# Patient Record
Sex: MALE | URBAN - METROPOLITAN AREA
[De-identification: names, ages, dates, MRNs, and addresses within clinical notes are randomized per-mention and may not be internally consistent; named-entity substitution may affect disease eponyms.]

---

## 2017-03-09 ENCOUNTER — IMPORTED ENCOUNTER (OUTPATIENT)
Dept: URBAN - METROPOLITAN AREA CLINIC 9 | Facility: CLINIC | Age: 82
End: 2017-03-09

## 2017-05-16 NOTE — PATIENT DISCUSSION
(A99.603) Other secondary cataract, bilateral - Assesment : Significant posterior capsule opacification present. - Plan : YAG OU

## 2017-05-16 NOTE — PATIENT DISCUSSION
(S05.803) Dermatochalasis of left upper eyelid - Assesment : Examination revealed Dermatochalasis - Plan : LID EVAL

## 2017-05-16 NOTE — PATIENT DISCUSSION
(E14.002) Dermatochalasis of right upper eyelid - Assesment : Examination revealed Dermatochalasis - Plan : LID EVAL

## 2017-06-16 NOTE — PATIENT DISCUSSION
(R93.242) Other secondary cataract, bilateral - Assesment : S/P yag capsulotomy,open/clear - Plan : Monitor for changes. Advised patient to call our office with decreased vision or an increase in symptoms.  05/2018 Exam

## 2017-06-16 NOTE — PATIENT DISCUSSION
(K52.761) Dermatochalasis of right upper eyelid - Assesment : Examination revealed Dermatochalasis - Plan : LID EVAL

## 2017-06-16 NOTE — PATIENT DISCUSSION
(N23.525) Dermatochalasis of left upper eyelid - Assesment : Examination revealed Dermatochalasis - Plan : LID EVAL

## 2017-09-07 ENCOUNTER — IMPORTED ENCOUNTER (OUTPATIENT)
Dept: URBAN - METROPOLITAN AREA CLINIC 9 | Facility: CLINIC | Age: 82
End: 2017-09-07

## 2018-03-08 ENCOUNTER — IMPORTED ENCOUNTER (OUTPATIENT)
Dept: URBAN - METROPOLITAN AREA CLINIC 9 | Facility: CLINIC | Age: 83
End: 2018-03-08

## 2018-08-02 NOTE — PATIENT DISCUSSION
(U99.116) Dermatochalasis of right upper eyelid - Assesment : Examination revealed Dermatochalasis - Plan : LID EVAL

## 2018-08-02 NOTE — PATIENT DISCUSSION
(Z57.934) Dermatochalasis of left upper eyelid - Assesment : Examination revealed Dermatochalasis - Plan : LID EVAL

## 2018-09-10 ENCOUNTER — IMPORTED ENCOUNTER (OUTPATIENT)
Dept: URBAN - METROPOLITAN AREA CLINIC 9 | Facility: CLINIC | Age: 83
End: 2018-09-10

## 2019-02-05 NOTE — PATIENT DISCUSSION
(H35.945) Keratoconjunct sicca, not specified as Sjogren's, bilateral - Assesment : NO FOREIGN BODY SEEN TODAY OD.   CORNEA IS IRRITATED SLIGHTLY OD - Plan : MAXITROL OINT AT BEDTIME FOR 10 DAYS OD (SENT TO PHARMACY) PF AT RECOMMENDED DURING THE DAY

## 2019-02-11 NOTE — PATIENT DISCUSSION
(N65.976) Keratoconjunct sicca, not specified as Sjogren's, bilateral - Assesment : PATIENT RECENTLY SAW PCP. PATIENT ON VALTREX. DX WITH SHINGLES. SOMETIMES THE SHINGLES CAN GET INSIDE THE EYE. RIGHT NOW THEY ARE ON THE LIDS AND NOT INSIDE THE EYE. SINCE FRIDAY IT GOT WORSE THEN GOT BETTER WITH THE SWELLING PER PATIENT. IMPROVING DAY TO DAY PER PT. CONTINUE VALTREX AT THIS TIME. PATIENT CAN TAKE IBUPROFEN AT THIS TIME FOR PAIN - Plan : CONTINUE MAXITROL OINT AT BEDTIME OD (RX SENT TO PHARMACY) CONTINUE VALTREX UNTIL IT RUNS OUT  PF AT RECOMMENDED DURING THE DAY  1 WEEK.   PATIENT TO CALL IF ANY DECREASE IN VISION

## 2019-02-11 NOTE — PATIENT DISCUSSION
(B02.9) Zoster without complications - Assesment : PATIENT RECENTLY SAW PCP. PATIENT ON VALTREX. DX WITH SHINGLES. SOMETIMES THE SHINGLES CAN GET INSIDE THE EYE. RIGHT NOW THEY ARE ON THE LIDS AND NOT INSIDE THE EYE. SINCE FRIDAY IT GOT WORSE THEN GOT BETTER WITH THE SWELLING PER PATIENT. IMPROVING DAY TO DAY PER PT. CONTINUE VALTREX AT THIS TIME.   PATIENT CAN TAKE IBUPROFEN AT THIS TIME FOR PAIN - Plan : CONTINUE VALTREX AT THIS TIME

## 2019-02-18 NOTE — PATIENT DISCUSSION
(B02.9) Zoster without complications - Assesment : SHINGLES RIGHT SIDE  DECREASED LID EDEMA AND ERYTHEMA  VESICLES RESOLVING. - Plan : CONTINUE VALTREX  CALL WITH INCREASED SYMPTOMS, FLOATERS  8/2019 EXAM

## 2019-03-11 ENCOUNTER — IMPORTED ENCOUNTER (OUTPATIENT)
Dept: URBAN - METROPOLITAN AREA CLINIC 9 | Facility: CLINIC | Age: 84
End: 2019-03-11

## 2019-09-03 NOTE — PATIENT DISCUSSION
(U71.528) Keratoconjunct sicca, not specified as Sjogren's, bilateral - Assesment : PATIENT NEVER USED THE MAXITROL THAT WAS PRESCRIBED - Plan : ARTIFICIAL TEARS 3-4 TIMES A DAY( SAMPLES GIVEN TO PATIENT)SYSTANE COMPLETE, REFRESH VIDA 3, THERA TEARS 1 YEAR EXAM

## 2020-10-07 ENCOUNTER — IMPORTED ENCOUNTER (OUTPATIENT)
Dept: URBAN - METROPOLITAN AREA CLINIC 9 | Facility: CLINIC | Age: 85
End: 2020-10-07

## 2020-11-09 ENCOUNTER — IMPORTED ENCOUNTER (OUTPATIENT)
Dept: URBAN - METROPOLITAN AREA CLINIC 9 | Facility: CLINIC | Age: 85
End: 2020-11-09

## 2021-04-05 ENCOUNTER — IMPORTED ENCOUNTER (OUTPATIENT)
Dept: URBAN - METROPOLITAN AREA CLINIC 9 | Facility: CLINIC | Age: 86
End: 2021-04-05

## 2021-10-18 ASSESSMENT — TONOMETRY
OS_IOP_MMHG: 16
OD_IOP_MMHG: 18
OS_IOP_MMHG: 15
OD_IOP_MMHG: 16
OS_IOP_MMHG: 16
OD_IOP_MMHG: 16
OD_IOP_MMHG: 18
OS_IOP_MMHG: 17
OS_IOP_MMHG: 14
OS_IOP_MMHG: 15
OD_IOP_MMHG: 18
OD_IOP_MMHG: 19
OS_IOP_MMHG: 18
OD_IOP_MMHG: 16

## 2021-10-18 ASSESSMENT — VISUAL ACUITY
OD_CC: 20/30 -2 SN
OS_CC: 20/25 -2 SN
OS_CC: 20/20 -2 SN
OD_CC: 20/20 - SN
OD_PH: 20/30 SN
OD_CC: 20/30 SN
OS_CC: 20/25 - SN
OS_CC: 20/20 -2 SN
OS_CC: 20/20 SN
OD_CC: 20/25 SN
OD_CC: 20/20 -2 SN
OS_CC: 20/30 SN
OD_CC: 20/20 - SN
OD_CC: 20/25 SN
OS_CC: 20/50 + SN

## 2021-10-18 ASSESSMENT — KERATOMETRY
OD_K2POWER_DIOPTERS: 41.5
OS_K2POWER_DIOPTERS: 42.25
OS_K1POWER_DIOPTERS: 44.5
OD_AXISANGLE2_DEGREES: 100
OD_K1POWER_DIOPTERS: 44.5
OS_AXISANGLE_DEGREES: 12
OS_AXISANGLE_DEGREES: 180
OS_AXISANGLE2_DEGREES: 90
OD_K1POWER_DIOPTERS: 44.5
OD_K2POWER_DIOPTERS: 43
OS_K2POWER_DIOPTERS: 43.25
OD_AXISANGLE_DEGREES: 179
OS_K1POWER_DIOPTERS: 44.5
OD_AXISANGLE_DEGREES: 10
OD_AXISANGLE2_DEGREES: 89
OS_AXISANGLE2_DEGREES: 102

## 2022-07-05 RX ORDER — LOVASTATIN 20 MG/1
TABLET ORAL
COMMUNITY

## 2022-07-05 RX ORDER — PREDNISONE 1 MG/1
TABLET ORAL
COMMUNITY

## 2022-07-05 RX ORDER — FOLIC ACID 1 MG/1
TABLET ORAL
COMMUNITY